# Patient Record
Sex: MALE | Race: WHITE | Employment: FULL TIME | URBAN - METROPOLITAN AREA
[De-identification: names, ages, dates, MRNs, and addresses within clinical notes are randomized per-mention and may not be internally consistent; named-entity substitution may affect disease eponyms.]

---

## 2023-03-08 SDOH — HEALTH STABILITY: PHYSICAL HEALTH: ON AVERAGE, HOW MANY DAYS PER WEEK DO YOU ENGAGE IN MODERATE TO STRENUOUS EXERCISE (LIKE A BRISK WALK)?: 2 DAYS

## 2023-03-08 SDOH — HEALTH STABILITY: PHYSICAL HEALTH: ON AVERAGE, HOW MANY MINUTES DO YOU ENGAGE IN EXERCISE AT THIS LEVEL?: 30 MIN

## 2023-03-09 ENCOUNTER — OFFICE VISIT (OUTPATIENT)
Dept: ORTHOPEDIC SURGERY | Age: 43
End: 2023-03-09

## 2023-03-09 DIAGNOSIS — S83.241A ACUTE MEDIAL MENISCUS TEAR OF RIGHT KNEE, INITIAL ENCOUNTER: Primary | ICD-10-CM

## 2023-03-09 DIAGNOSIS — M17.11 PRIMARY OSTEOARTHRITIS OF RIGHT KNEE: ICD-10-CM

## 2023-03-09 DIAGNOSIS — M17.12 PRIMARY OSTEOARTHRITIS OF LEFT KNEE: ICD-10-CM

## 2023-03-09 DIAGNOSIS — M25.561 RIGHT KNEE PAIN, UNSPECIFIED CHRONICITY: ICD-10-CM

## 2023-03-12 NOTE — PROGRESS NOTES
Date:  3/9/2023    Name:  Vickie Blankenship  Address:  [address] [facility] [address]    :  1980      Age:   43 y.o. Chief Complaint    Knee Pain (Right knee pain )      History of Present Illness:  Vickie Blankenship is a 43 y.o. male who presents for evaluation of his right knee pain. The patient has a past medical history of an ACL reconstruction on the left knee. Patient presents today due to a new injury he sustained over the summer while playing basketball. He was playing and felt a pop with some pain. His pain improved however he went on recent skiing trip and felt another pop approximately 1 week ago. He noticed increased pain in the medial joint line associated with knee swelling. He describes his pain as 4/10, achy and sharp with decreased range of motion. Since then with exercise he has noticed increased pain and associated crepitus and mechanical symptoms in the medial compartment of the right knee. Conservative treatment includes: Rest, ice, activity modification and over-the-counter pain medication. He has attempted a home exercise program. The patient denies any numbness, paresthesias, or weakness. Past Medical History:   Diagnosis Date    Seasonal allergies         Past Surgical History:   Procedure Laterality Date    CHOLECYSTECTOMY         No family history on file.     Social History     Socioeconomic History    Marital status:    Tobacco Use    Smoking status: Some Days     Types: Cigarettes   Substance and Sexual Activity    Alcohol use: Yes     Comment: Occasionally    Drug use: No     Social Determinants of Health     Physical Activity: Insufficiently Active    Days of Exercise per Week: 2 days    Minutes of Exercise per Session: 30 min   Intimate Partner Violence: Not At Risk    Fear of Current or Ex-Partner: No    Emotionally Abused: No    Physically Abused: No    Sexually Abused: No       Current Outpatient Medications   Medication Sig Dispense Refill    Fexofenadine HCl (ALLEGRA ALLERGY PO) Take  by mouth. omeprazole (PRILOSEC) 10 MG capsule Take 10 mg by mouth daily. lansoprazole (PREVACID) 30 MG capsule Take 1 capsule by mouth daily. 30 capsule 0     No current facility-administered medications for this visit. No Known Allergies      Review of Systems:  A 14 point review of systems was completed by the patient and is available in the media section of the scanned medical record and was reviewed. Vital Signs: There were no vitals taken for this visit. General Exam:    General: Callum Stern is a healthy and well appearing 43y.o. year old male who is sitting comfortably in our office in no acute distress. Neuro: Alert & Oriented x 3,  normal,  no focal deficits noted. Normal mood & affect. Eyes: Sclera clear  Ears: Normal external ear  Pulm: Respirations unlabored and regular   Skin: Warm, well perfused      Knee Examination:     Inspection:  No effusion, ecchymosis, discoloration, erythema, excessive warmth. no gross deformities, no signs of infection. Palpation: There is patellofemoral crepitus, there is medial joint line tenderness. No other osseous or soft tissue tenderness around the knee. Negative calf tenderness    Range of Motion:  0-125 degrees without pain or difficulty. Appropriate patellar mobility. Strength:  4-/5 quadriceps, 4-/5 hamstrings    Special Tests:  No ligament instability, valgus and varus stress test are stable at 0 and 30°. Lachman's exhibits a solid endpoint. Negative posterior drawer. Negative Homans sign. Positive flexion test.  Positive Andreas's. Gait: Mildly antalgic, without the use of assistive devices    Alignment: Neutral    Neuro: Sensation equal bilateral lower extremities    Vascular: 2+ posterior tibialis pulse      Contralateral Knee Comparison Examination: Left    Inspection:  No effusion, ecchymosis, discoloration, erythema, excessive warmth.  no gross deformities, no signs of infection. Palpation: There is patellofemoral crepitus, there is medial joint line tenderness. No other osseous or soft tissue tenderness around the knee. Negative calf tenderness    Range of Motion:  0-125 degrees without pain or difficulty. Appropriate patellar mobility. Strength:  4-/5 quadriceps, 4-/5 hamstrings    Special Tests:  No ligament instability, valgus and varus stress test are stable at 0 and 30°. Lachman's exhibits a solid endpoint. Negative posterior drawer. Negative Homans sign    Gait: Mildly antalgic, without the use of assistive devices    Alignment: Neutral    Neuro: Sensation equal bilateral lower extremities    Vascular: 2+ posterior tibialis pulse        Radiology:   Previously obtain imaging reviewed. X-rays obtained and reviewed in office: AP and merchant view of both knees and a lateral of the right. Impression: No fractures, dislocations, visible tumors, or signs of acute trauma. The medial and lateral femoral-tibial compartments exhibit less than 50% decreased joint space bilaterally. There's 50% decreased joint spacing in the patellofemoral joint of the right knee. Over 50% decreased femoral-tibial joint space decreased in the medial aspect of the left knee. KL grade 2. Patella is riding appropriately in the trochlear groove with no subluxation or tilt noted. Hesham-Yared ratio of approximately 1. No osteophytes or bone growths noted. No loose bodies or foreign bodies.         Office Procedures:  Orders Placed This Encounter   Procedures    XR KNEE RIGHT (3 VIEWS)     Standing Status:   Future     Number of Occurrences:   1     Standing Expiration Date:   9/9/2023    MRI KNEE RIGHT WO CONTRAST     Standing Status:   Future     Standing Expiration Date:   3/9/2024     Scheduling Instructions:      [address] [place] [address]            PHONE: [phone]      FAX: [phone]     Order Specific Question:   Reason for exam:     Answer:   R/O Medial meniscus tear     Order Specific Question:   What is the sedation requirement?     Answer:   None            Assessment: This patient is a 42 y.o. male presenting to the office for an evaluation of his acute on chronic right knee pain.  This patient has a working diagnosis of a medial meniscus tear in the right knee.    Encounter Diagnoses   Name Primary?    Acute medial meniscus tear of right knee, initial encounter Yes    Right knee pain, unspecified chronicity     Primary osteoarthritis of right knee     Primary osteoarthritis of left knee            Medical decision making:  Patient's workup and evaluation were reviewed with the patient in the office today.  Imaging was reviewed with the patient.  Given the patient's history and physical exam I believe with a reasonable degree of medical certainty that the patient has sustained a medial meniscus tear in the right knee.  Because of this I believe the patient has a medical necessity to obtain an MRI of the right knee.  He may be amendable to arthroscopy.  Risks and benefits of conservative treatment versus undergoing arthroscopy were reviewed.     All the patient's questions were answered while in the clinic.  The patient is understanding of all instructions and agrees with the plan.      Treatment Plan:    MRI of the right knee without contrast  Activity modification/Rest   Ice 20 minutes ever 1-2 hours PRN  Take medications as prescribed/instructed  Elevation   Lightweight exercise/low impact exercise  Appropriate diet/weight management      Follow up: After receiving MRI imaging and as needed      This patient exhibits moderate complexity for obtaining an independent history, reviewing past medical records, independent interpretation of diagnostic imaging, and further coordinating care.  The patient exhibits low risk given that the patient is being treated conservatively at  this time. Anusha Hartmann PA-C    Physician Assistant - Certified  [address] [place] [address]    03/12/23 8:28 PM        This dictation was performed with a verbal recognition program (DRAGON) and it was checked for errors. It is possible that there are still dictated errors within this office note. If so, please bring any errors to my attention for an addendum. All efforts were made to ensure that this office note is accurate.

## 2023-03-20 ENCOUNTER — TELEPHONE (OUTPATIENT)
Dept: ORTHOPEDIC SURGERY | Age: 43
End: 2023-03-20

## 2023-03-20 NOTE — TELEPHONE ENCOUNTER
Called and LVM that MRI had been authorized. All info was faxed to [facility] and he can schedule at his convenience. He will need a follow up with / Harley after scan.

## 2023-03-20 NOTE — TELEPHONE ENCOUNTER
Faxing Patient Information     Facility Name: [facility] Name: Ivatorin Alicea  Contact Number: [phone]  Facility Fax Number: [phone]

## 2023-04-04 ENCOUNTER — OFFICE VISIT (OUTPATIENT)
Dept: ORTHOPEDIC SURGERY | Age: 43
End: 2023-04-04

## 2023-04-04 VITALS — WEIGHT: 200 LBS | HEIGHT: 68 IN | BODY MASS INDEX: 30.31 KG/M2

## 2023-04-04 DIAGNOSIS — S83.241A ACUTE MEDIAL MENISCUS TEAR OF RIGHT KNEE, INITIAL ENCOUNTER: Primary | ICD-10-CM

## 2023-04-04 DIAGNOSIS — M25.561 RIGHT KNEE PAIN, UNSPECIFIED CHRONICITY: ICD-10-CM

## 2023-04-05 NOTE — PROGRESS NOTES
understanding of all instructions and agrees with the plan. Treatment Plan:    Weightbearing as tolerated  Medical optimization  Activity modification/Rest   Ice 20 minutes ever 1-2 hours PRN  Take medications as prescribed/instructed  Elevation   Lightweight exercise/low impact exercise  Appropriate diet/weight management      Follow up: one weeks and as needed      This patient exhibits moderate complexity for obtaining an independent history, reviewing past medical records, independent interpretation of diagnostic imaging, and further coordinating care. The patient exhibits low risk given that the patient is being treated conservatively at this time. Surgery is being considered as a treatment option. Candida Renteria PA-C    Physician Assistant - Certified  [address] [place] [address]    04/05/23 9:45 AM      This dictation was performed with a verbal recognition program [facility]) and it was checked for errors. It is possible that there are still dictated errors within this office note. If so, please bring any errors to my attention for an addendum. All efforts were made to ensure that this office note is accurate.

## 2023-04-13 ENCOUNTER — OFFICE VISIT (OUTPATIENT)
Dept: ORTHOPEDIC SURGERY | Age: 43
End: 2023-04-13

## 2023-04-13 VITALS — HEIGHT: 68 IN | BODY MASS INDEX: 30.31 KG/M2 | WEIGHT: 200 LBS

## 2023-04-13 DIAGNOSIS — M25.561 RIGHT KNEE PAIN, UNSPECIFIED CHRONICITY: ICD-10-CM

## 2023-04-13 DIAGNOSIS — S83.241A ACUTE MEDIAL MENISCUS TEAR OF RIGHT KNEE, INITIAL ENCOUNTER: Primary | ICD-10-CM

## 2023-04-13 DIAGNOSIS — M17.11 PRIMARY OSTEOARTHRITIS OF RIGHT KNEE: ICD-10-CM

## 2023-04-14 ENCOUNTER — TELEPHONE (OUTPATIENT)
Dept: ORTHOPEDIC SURGERY | Age: 43
End: 2023-04-14

## 2023-04-18 LAB
ALBUMIN SERPL-MCNC: 4.5 G/DL (ref 3.5–5.7)
ALP BLD-CCNC: 70 IU/L (ref 35–135)
ALT SERPL-CCNC: 24 IU/L (ref 10–60)
ANION GAP SERPL CALCULATED.3IONS-SCNC: 7 MMOL/L (ref 4–16)
AST SERPL-CCNC: 19 IU/L (ref 10–40)
BASOPHILS ABSOLUTE: 0 THOU/MCL (ref 0–0.2)
BASOPHILS ABSOLUTE: 1 %
BILIRUB SERPL-MCNC: 0.6 MG/DL (ref 0–1.2)
BUN BLDV-MCNC: 14 MG/DL (ref 8–26)
CALCIUM SERPL-MCNC: 10.1 MG/DL (ref 8.5–10.4)
CHLORIDE BLD-SCNC: 104 MEQ/L (ref 98–111)
CO2: 29 MMOL/L (ref 21–31)
CREAT SERPL-MCNC: 0.99 MG/DL (ref 0.7–1.3)
EGFR (CKD-EPI): 98 ML/MIN/1.73 M2
EKG ATRIAL RATE: 60 MS
EKG I-40 FRONT AXIS: 27 DEG
EKG I-40 HORIZONTAL AXIS: 79 DEG
EKG P DURATION: 105 MS
EKG P FRONT AXIS: 49 DEG
EKG P HORIZONTAL AXIS: 31 DEG
EKG P-R INTERVAL: 128 MS
EKG Q ONSET: 504 MS
EKG Q-T INTERVAL: 395 MS
EKG QRS AXIS: 63 DEG
EKG QRS HORIZONTAL AXIS: 11 DEG
EKG QRSD INTERVAL: 86 MS
EKG QTCB: 395 MS
EKG QTCF: 395 MS
EKG RR INTERVAL: 1000 MS
EKG S-T FRONT AXIS: 82 DEG
EKG S-T HORIZONTAL AXIS: 96 DEG
EKG T HORIZONTAL AXIS: 73 DEG
EKG T WAVE AXIS: 65 DEG
EKG T-40 FRONT AXIS: 83 DEG
EKG T-40 HORIZONTAL AXIS: -21 DEG
EOSINOPHILS ABSOLUTE: 0.1 THOU/MCL (ref 0.03–0.45)
EOSINOPHILS RELATIVE PERCENT: 3 %
ESTIMATED AVERAGE GLUCOSE: 105 MG/DL
GLUCOSE BLD-MCNC: 90 MG/DL (ref 70–99)
HBA1C MFR BLD: 5.3 % (ref 4.2–5.6)
HCT VFR BLD CALC: 46.2 % (ref 40–50)
HEART RATE: 60 BPM
HEIGHT, INCHES: NORMAL IN
HEMOGLOBIN: 15.7 G/DL (ref 13.5–16.5)
IDENTIFICATION: NORMAL
IDENTIFICATION: NORMAL
IMPRESSION: NORMAL
IMPRESSION: NORMAL
INR BLD: 1 (ref 0.8–1.2)
LYMPHOCYTES ABSOLUTE: 1.7 THOU/MCL (ref 1–4)
LYMPHOCYTES RELATIVE PERCENT: 31 %
MCH RBC QN AUTO: 30.1 PG (ref 27–33)
MCHC RBC AUTO-ENTMCNC: 33.9 G/DL (ref 32–36)
MCV RBC AUTO: 88.7 FL (ref 82–97)
MONOCYTES # BLD: 10 %
MONOCYTES ABSOLUTE: 0.5 THOU/MCL (ref 0.2–0.9)
NEUTROPHILS ABSOLUTE: 3.1 THOU/MCL (ref 1.8–7.7)
PDW BLD-RTO: 13.5 % (ref 12.3–17)
PLATELET # BLD: 271 THOU/MCL (ref 140–375)
PMV BLD AUTO: 8 FL (ref 7.4–11.5)
POTASSIUM SERPL-SCNC: 4.8 MEQ/L (ref 3.6–5.1)
PROTHROMBIN TIME: 13.3 SECONDS (ref 11.7–14.2)
RBC # BLD: 5.21 MIL/MCL (ref 4.4–5.8)
SEG NEUTROPHILS: 55 %
SODIUM BLD-SCNC: 140 MEQ/L (ref 135–145)
STAPH AUREUS CAPSULAR POLYSACCHARIDE ENZYME GENE: NEGATIVE
STAPH AUREUS.METHICILLIN RESISTANT DNA: NEGATIVE
TOTAL PROTEIN: 7.2 G/DL (ref 6–8)
VITAMIN D 25-HYDROXY: 28.5 NG/ML (ref 30–150)
WBC # BLD: 5.5 THOU/MCL (ref 3.6–10.5)

## 2023-04-18 NOTE — TELEPHONE ENCOUNTER
Auth: # [id number]    Date: 04/26/23 thru 07/25/23  Type of SX:  Outpatient  Location: [facility]  CPT: 38185, 27108   DX Code: R52.484E  SX area: Rt knee  Insurance: [facility]

## 2023-04-19 ENCOUNTER — TELEPHONE (OUTPATIENT)
Dept: ORTHOPEDIC SURGERY | Age: 43
End: 2023-04-19

## 2023-04-19 DIAGNOSIS — S83.241A ACUTE MEDIAL MENISCUS TEAR OF RIGHT KNEE, INITIAL ENCOUNTER: ICD-10-CM

## 2023-04-19 DIAGNOSIS — M25.561 RIGHT KNEE PAIN, UNSPECIFIED CHRONICITY: ICD-10-CM

## 2023-04-19 DIAGNOSIS — M17.11 PRIMARY OSTEOARTHRITIS OF RIGHT KNEE: Primary | ICD-10-CM

## 2023-04-24 NOTE — PROGRESS NOTES
Date:  2023    Name:  Sandhya Barrios  Address:  [address] [facility] [address]    :  1980      Age:   43 y.o. Chief Complaint    Knee Pain (Left knee, discuss sx)      History of Present Illness:  Sandhya Barrios is a 43 y.o. male who presents for a postop check and surgical discussion. To recap, the patient has a past medical history of an ACL reconstruction on the left knee. Patient presented to our office approximately 5 weeks ago to evaluate a new injury he sustained over the summer while playing basketball. He was playing and felt a pop with some pain. His pain improved however he went on recent skiing trip and felt another pop approximately 4-5 weeks ago and has been experiencing increased medial joint line discomfort ever since. An MRI was obtained to evaluate for a new medial meniscus tear. He continues to exhibit tenderness in the medial joint line with lateral movements. Conservative treatment has been utilized including: rest, ice, elevation, home exercises, activity modification, and NSAIDs as needed. The patient denies any new injury. The patient denies any numbness, paresthesias, or weakness. Past Medical History:   Diagnosis Date    Medial meniscus tear     RIGHT    Seasonal allergies     Wears contact lenses     Wears glasses         Past Surgical History:   Procedure Laterality Date    CHOLECYSTECTOMY         No family history on file.     Social History     Socioeconomic History    Marital status:      Spouse name: None    Number of children: None    Years of education: None    Highest education level: None   Tobacco Use    Smoking status: Some Days     Types: Cigarettes   Vaping Use    Vaping Use: Never used   Substance and Sexual Activity    Alcohol use: Yes     Comment: Occasionally    Drug use: No     Social Determinants of Health     Physical Activity: Insufficiently Active    Days of Exercise per Week: 2 days    Minutes of

## 2023-04-25 ENCOUNTER — HOSPITAL ENCOUNTER (OUTPATIENT)
Dept: PHYSICAL THERAPY | Age: 43
Setting detail: THERAPIES SERIES
Discharge: HOME | End: 2023-04-25
Payer: COMMERCIAL

## 2023-04-25 ENCOUNTER — ANESTHESIA EVENT (OUTPATIENT)
Dept: OPERATING ROOM | Age: 43
End: 2023-04-25
Payer: COMMERCIAL

## 2023-04-25 ENCOUNTER — OFFICE VISIT (OUTPATIENT)
Dept: ORTHOPEDIC SURGERY | Age: 43
End: 2023-04-25

## 2023-04-25 VITALS — HEIGHT: 68 IN | WEIGHT: 216 LBS | BODY MASS INDEX: 32.74 KG/M2

## 2023-04-25 DIAGNOSIS — Z01.818 PRE-OP EXAMINATION: Primary | ICD-10-CM

## 2023-04-25 DIAGNOSIS — S83.241A ACUTE MEDIAL MENISCUS TEAR OF RIGHT KNEE, INITIAL ENCOUNTER: ICD-10-CM

## 2023-04-25 DIAGNOSIS — M17.11 PRIMARY OSTEOARTHRITIS OF RIGHT KNEE: ICD-10-CM

## 2023-04-25 PROCEDURE — MISC250 COMPRESSION STOCKING: Performed by: PHYSICIAN ASSISTANT

## 2023-04-25 PROCEDURE — 97161 PT EVAL LOW COMPLEX 20 MIN: CPT | Performed by: PHYSICAL THERAPIST

## 2023-04-25 PROCEDURE — PREOPEXAM PRE-OP EXAM: Performed by: PHYSICIAN ASSISTANT

## 2023-04-25 NOTE — PLAN OF CARE
restriction or pain. (patient specific functional goal)    [] Progressing: [] Met: [] Not Met: [] Adjusted       Electronically signed by:  Rosalinda Alexis, PT, DPT    Note: If patient does not return for scheduled/ recommended follow up visits, this note will serve as a discharge from care along with most recent update on progress.

## 2023-04-25 NOTE — FLOWSHEET NOTE
[facility] and [address],  Sports Performance and Rehabilitation, [facility] [address]  [address] [facility]   [place], [place]  Phone: [phone]  Fax: [phone]      Physical Therapy Daily Treatment Note  Date:  2023    Patient Name:  Mesha Stanton    :  1980  MRN: [id number]  Restrictions/Precautions:    Medical/Treatment Diagnosis Information:  Primary osteoarthritis of right knee [M17.11]  Acute medial meniscus tear of right knee, initial encounter Patricia Strickland  Right knee pain, unspecified chronicity [M25.561]  Treatment Diagnosis: M25.561 R Knee Pain, R53.1 Weakness  Pre-op for R Knee Scope 89  Insurance/Certification information:  PT Insurance Information: [facility], no auth  Physician Information:  Soco Flaherty MD    Has the plan of care been signed (Y/N):        []  Yes  [x]  No     Date of Patient follow up with Physician: 23      Is this a Progress Report:     []  Yes  [x]  No        If Yes:  Date Range for reporting period:  Beginnin23  Ending:     Progress report will be due (10 Rx or 30 days whichever is less):        Recertification will be due (POC Duration  / 90 days whichever is less): 23         Visit # Insurance Allowable Auth Required   1 60 []  Yes []  No          OUTCOME MEASURE DATE SCORE   LEFS 23 20%            Latex Allergy:  [x]NO      []YES  Preferred Language for Healthcare:   [x]English       []other:    Pain level:  2-8/10     SUBJECTIVE:  See eval    OBJECTIVE:               ROM PROM AROM Overpressure Comment     L R L R L R     Flexion 140 138             Extension 0 0                                                       Strength L R Comment   Quad 5 5     Hamstring 5 4     Gastroc NT NT     Hip  flexion 5 4+     Hip abd 4+ 4                               Special Test Results/Comment   Homans NV   Temp NV         Girth L R Post-Vaso   Mid Patella 40.8 42.0     Suprapatellar

## 2023-04-26 ENCOUNTER — HOSPITAL ENCOUNTER (OUTPATIENT)
Age: 43
Setting detail: OUTPATIENT SURGERY
Discharge: HOME | End: 2023-04-26
Attending: ORTHOPAEDIC SURGERY | Admitting: ORTHOPAEDIC SURGERY
Payer: COMMERCIAL

## 2023-04-26 ENCOUNTER — ANESTHESIA (OUTPATIENT)
Dept: OPERATING ROOM | Age: 43
End: 2023-04-26
Payer: COMMERCIAL

## 2023-04-26 VITALS
BODY MASS INDEX: 33.68 KG/M2 | OXYGEN SATURATION: 97 % | SYSTOLIC BLOOD PRESSURE: 111 MMHG | WEIGHT: 214.6 LBS | DIASTOLIC BLOOD PRESSURE: 77 MMHG | HEIGHT: 67 IN | TEMPERATURE: 97 F | HEART RATE: 85 BPM | RESPIRATION RATE: 16 BRPM

## 2023-04-26 DIAGNOSIS — Z98.890 S/P ARTHROSCOPIC SURGERY OF RIGHT KNEE: Primary | ICD-10-CM

## 2023-04-26 PROCEDURE — 3600000004 HC SURGERY LEVEL 4 BASE: Performed by: ORTHOPAEDIC SURGERY

## 2023-04-26 PROCEDURE — 7100000011 HC PHASE II RECOVERY - ADDTL 15 MIN: Performed by: ORTHOPAEDIC SURGERY

## 2023-04-26 PROCEDURE — 2580000003 HC RX 258: Performed by: ORTHOPAEDIC SURGERY

## 2023-04-26 PROCEDURE — 3600000014 HC SURGERY LEVEL 4 ADDTL 15MIN: Performed by: ORTHOPAEDIC SURGERY

## 2023-04-26 PROCEDURE — 6360000002 HC RX W HCPCS: Performed by: NURSE ANESTHETIST, CERTIFIED REGISTERED

## 2023-04-26 PROCEDURE — 2580000003 HC RX 258: Performed by: ANESTHESIOLOGY

## 2023-04-26 PROCEDURE — 2709999900 HC NON-CHARGEABLE SUPPLY: Performed by: ORTHOPAEDIC SURGERY

## 2023-04-26 PROCEDURE — 7100000010 HC PHASE II RECOVERY - FIRST 15 MIN: Performed by: ORTHOPAEDIC SURGERY

## 2023-04-26 PROCEDURE — 7100000001 HC PACU RECOVERY - ADDTL 15 MIN: Performed by: ORTHOPAEDIC SURGERY

## 2023-04-26 PROCEDURE — 2720000010 HC SURG SUPPLY STERILE: Performed by: ORTHOPAEDIC SURGERY

## 2023-04-26 PROCEDURE — 6360000002 HC RX W HCPCS: Performed by: ANESTHESIOLOGY

## 2023-04-26 PROCEDURE — 6360000002 HC RX W HCPCS: Performed by: ORTHOPAEDIC SURGERY

## 2023-04-26 PROCEDURE — 3700000001 HC ADD 15 MINUTES (ANESTHESIA): Performed by: ORTHOPAEDIC SURGERY

## 2023-04-26 PROCEDURE — 7100000000 HC PACU RECOVERY - FIRST 15 MIN: Performed by: ORTHOPAEDIC SURGERY

## 2023-04-26 PROCEDURE — 2580000003 HC RX 258: Performed by: NURSE ANESTHETIST, CERTIFIED REGISTERED

## 2023-04-26 PROCEDURE — 3700000000 HC ANESTHESIA ATTENDED CARE: Performed by: ORTHOPAEDIC SURGERY

## 2023-04-26 PROCEDURE — 2500000003 HC RX 250 WO HCPCS: Performed by: ORTHOPAEDIC SURGERY

## 2023-04-26 RX ORDER — SODIUM CHLORIDE 0.9 % (FLUSH) 0.9 %
5-40 SYRINGE (ML) INJECTION EVERY 12 HOURS SCHEDULED
Status: DISCONTINUED | OUTPATIENT
Start: 2023-04-26 | End: 2023-04-26 | Stop reason: HOSPADM

## 2023-04-26 RX ORDER — LIDOCAINE HYDROCHLORIDE 20 MG/ML
INJECTION, SOLUTION INTRAVENOUS PRN
Status: DISCONTINUED | OUTPATIENT
Start: 2023-04-26 | End: 2023-04-26 | Stop reason: SDUPTHER

## 2023-04-26 RX ORDER — KETOROLAC TROMETHAMINE 30 MG/ML
INJECTION, SOLUTION INTRAMUSCULAR; INTRAVENOUS PRN
Status: DISCONTINUED | OUTPATIENT
Start: 2023-04-26 | End: 2023-04-26 | Stop reason: SDUPTHER

## 2023-04-26 RX ORDER — LABETALOL HYDROCHLORIDE 5 MG/ML
10 INJECTION, SOLUTION INTRAVENOUS
Status: DISCONTINUED | OUTPATIENT
Start: 2023-04-26 | End: 2023-04-26 | Stop reason: HOSPADM

## 2023-04-26 RX ORDER — DEXAMETHASONE SODIUM PHOSPHATE 4 MG/ML
INJECTION, SOLUTION INTRA-ARTICULAR; INTRALESIONAL; INTRAMUSCULAR; INTRAVENOUS; SOFT TISSUE PRN
Status: DISCONTINUED | OUTPATIENT
Start: 2023-04-26 | End: 2023-04-26 | Stop reason: SDUPTHER

## 2023-04-26 RX ORDER — PROPOFOL 10 MG/ML
INJECTION, EMULSION INTRAVENOUS PRN
Status: DISCONTINUED | OUTPATIENT
Start: 2023-04-26 | End: 2023-04-26 | Stop reason: SDUPTHER

## 2023-04-26 RX ORDER — ONDANSETRON 2 MG/ML
INJECTION INTRAMUSCULAR; INTRAVENOUS PRN
Status: DISCONTINUED | OUTPATIENT
Start: 2023-04-26 | End: 2023-04-26 | Stop reason: SDUPTHER

## 2023-04-26 RX ORDER — SODIUM CHLORIDE, SODIUM LACTATE, POTASSIUM CHLORIDE, CALCIUM CHLORIDE 600; 310; 30; 20 MG/100ML; MG/100ML; MG/100ML; MG/100ML
INJECTION, SOLUTION INTRAVENOUS CONTINUOUS
Status: DISCONTINUED | OUTPATIENT
Start: 2023-04-26 | End: 2023-04-26 | Stop reason: HOSPADM

## 2023-04-26 RX ORDER — SODIUM CHLORIDE, SODIUM LACTATE, POTASSIUM CHLORIDE, CALCIUM CHLORIDE 600; 310; 30; 20 MG/100ML; MG/100ML; MG/100ML; MG/100ML
INJECTION, SOLUTION INTRAVENOUS CONTINUOUS PRN
Status: DISCONTINUED | OUTPATIENT
Start: 2023-04-26 | End: 2023-04-26 | Stop reason: SDUPTHER

## 2023-04-26 RX ORDER — ROPIVACAINE HYDROCHLORIDE 5 MG/ML
INJECTION, SOLUTION EPIDURAL; INFILTRATION; PERINEURAL PRN
Status: DISCONTINUED | OUTPATIENT
Start: 2023-04-26 | End: 2023-04-26 | Stop reason: HOSPADM

## 2023-04-26 RX ORDER — MEPERIDINE HYDROCHLORIDE 25 MG/ML
12.5 INJECTION INTRAMUSCULAR; INTRAVENOUS; SUBCUTANEOUS EVERY 5 MIN PRN
Status: DISCONTINUED | OUTPATIENT
Start: 2023-04-26 | End: 2023-04-26 | Stop reason: HOSPADM

## 2023-04-26 RX ORDER — HYDRALAZINE HYDROCHLORIDE 20 MG/ML
10 INJECTION INTRAMUSCULAR; INTRAVENOUS
Status: DISCONTINUED | OUTPATIENT
Start: 2023-04-26 | End: 2023-04-26 | Stop reason: HOSPADM

## 2023-04-26 RX ORDER — MIDAZOLAM HYDROCHLORIDE 1 MG/ML
INJECTION INTRAMUSCULAR; INTRAVENOUS PRN
Status: DISCONTINUED | OUTPATIENT
Start: 2023-04-26 | End: 2023-04-26 | Stop reason: SDUPTHER

## 2023-04-26 RX ORDER — SODIUM CHLORIDE, SODIUM LACTATE, POTASSIUM CHLORIDE, AND CALCIUM CHLORIDE .6; .31; .03; .02 G/100ML; G/100ML; G/100ML; G/100ML
IRRIGANT IRRIGATION PRN
Status: DISCONTINUED | OUTPATIENT
Start: 2023-04-26 | End: 2023-04-26 | Stop reason: HOSPADM

## 2023-04-26 RX ORDER — FENTANYL CITRATE 50 UG/ML
INJECTION, SOLUTION INTRAMUSCULAR; INTRAVENOUS PRN
Status: DISCONTINUED | OUTPATIENT
Start: 2023-04-26 | End: 2023-04-26 | Stop reason: SDUPTHER

## 2023-04-26 RX ORDER — SODIUM CHLORIDE 0.9 % (FLUSH) 0.9 %
5-40 SYRINGE (ML) INJECTION PRN
Status: DISCONTINUED | OUTPATIENT
Start: 2023-04-26 | End: 2023-04-26 | Stop reason: HOSPADM

## 2023-04-26 RX ORDER — DIPHENHYDRAMINE HYDROCHLORIDE 50 MG/ML
12.5 INJECTION INTRAMUSCULAR; INTRAVENOUS
Status: DISCONTINUED | OUTPATIENT
Start: 2023-04-26 | End: 2023-04-26 | Stop reason: HOSPADM

## 2023-04-26 RX ORDER — METOCLOPRAMIDE HYDROCHLORIDE 5 MG/ML
10 INJECTION INTRAMUSCULAR; INTRAVENOUS
Status: DISCONTINUED | OUTPATIENT
Start: 2023-04-26 | End: 2023-04-26 | Stop reason: HOSPADM

## 2023-04-26 RX ORDER — SODIUM CHLORIDE 9 MG/ML
25 INJECTION, SOLUTION INTRAVENOUS PRN
Status: DISCONTINUED | OUTPATIENT
Start: 2023-04-26 | End: 2023-04-26 | Stop reason: HOSPADM

## 2023-04-26 RX ORDER — HYDROCODONE BITARTRATE AND ACETAMINOPHEN 5; 325 MG/1; MG/1
1 TABLET ORAL EVERY 4 HOURS PRN
Qty: 18 TABLET | Refills: 0 | Status: SHIPPED | OUTPATIENT
Start: 2023-04-26 | End: 2023-04-29

## 2023-04-26 RX ADMIN — CEFAZOLIN 2000 MG: 2 INJECTION, POWDER, FOR SOLUTION INTRAMUSCULAR; INTRAVENOUS at 10:04

## 2023-04-26 RX ADMIN — MIDAZOLAM HYDROCHLORIDE 2 MG: 2 INJECTION, SOLUTION INTRAMUSCULAR; INTRAVENOUS at 10:03

## 2023-04-26 RX ADMIN — HYDROMORPHONE HYDROCHLORIDE 0.5 MG: 1 INJECTION, SOLUTION INTRAMUSCULAR; INTRAVENOUS; SUBCUTANEOUS at 11:38

## 2023-04-26 RX ADMIN — FENTANYL CITRATE 100 MCG: 50 INJECTION, SOLUTION INTRAMUSCULAR; INTRAVENOUS at 10:07

## 2023-04-26 RX ADMIN — ONDANSETRON 4 MG: 2 INJECTION INTRAMUSCULAR; INTRAVENOUS at 10:13

## 2023-04-26 RX ADMIN — FENTANYL CITRATE 50 MCG: 50 INJECTION, SOLUTION INTRAMUSCULAR; INTRAVENOUS at 11:05

## 2023-04-26 RX ADMIN — KETOROLAC TROMETHAMINE 30 MG: 30 INJECTION, SOLUTION INTRAMUSCULAR at 10:58

## 2023-04-26 RX ADMIN — SODIUM CHLORIDE, POTASSIUM CHLORIDE, SODIUM LACTATE AND CALCIUM CHLORIDE: 600; 310; 30; 20 INJECTION, SOLUTION INTRAVENOUS at 09:54

## 2023-04-26 RX ADMIN — DEXAMETHASONE SODIUM PHOSPHATE 4 MG: 4 INJECTION, SOLUTION INTRAMUSCULAR; INTRAVENOUS at 10:13

## 2023-04-26 RX ADMIN — SODIUM CHLORIDE, SODIUM LACTATE, POTASSIUM CHLORIDE, AND CALCIUM CHLORIDE: .6; .31; .03; .02 INJECTION, SOLUTION INTRAVENOUS at 10:04

## 2023-04-26 RX ADMIN — PROPOFOL 300 MG: 10 INJECTION, EMULSION INTRAVENOUS at 10:07

## 2023-04-26 RX ADMIN — SODIUM CHLORIDE, SODIUM LACTATE, POTASSIUM CHLORIDE, AND CALCIUM CHLORIDE: .6; .31; .03; .02 INJECTION, SOLUTION INTRAVENOUS at 10:59

## 2023-04-26 RX ADMIN — LIDOCAINE HYDROCHLORIDE 100 MG: 20 INJECTION, SOLUTION INTRAVENOUS at 10:07

## 2023-04-26 ASSESSMENT — PAIN DESCRIPTION - PAIN TYPE: TYPE: SURGICAL PAIN

## 2023-04-26 ASSESSMENT — PAIN SCALES - GENERAL
PAINLEVEL_OUTOF10: 0
PAINLEVEL_OUTOF10: 6
PAINLEVEL_OUTOF10: 4
PAINLEVEL_OUTOF10: 4
PAINLEVEL_OUTOF10: 0
PAINLEVEL_OUTOF10: 6

## 2023-04-26 ASSESSMENT — PAIN DESCRIPTION - FREQUENCY: FREQUENCY: CONTINUOUS

## 2023-04-26 ASSESSMENT — PAIN DESCRIPTION - ORIENTATION
ORIENTATION: RIGHT
ORIENTATION: RIGHT

## 2023-04-26 ASSESSMENT — PAIN - FUNCTIONAL ASSESSMENT
PAIN_FUNCTIONAL_ASSESSMENT: ACTIVITIES ARE NOT PREVENTED
PAIN_FUNCTIONAL_ASSESSMENT: 0-10

## 2023-04-26 ASSESSMENT — PAIN DESCRIPTION - DESCRIPTORS
DESCRIPTORS: SORE;THROBBING
DESCRIPTORS: SORE;THROBBING

## 2023-04-26 ASSESSMENT — PAIN DESCRIPTION - LOCATION
LOCATION: KNEE
LOCATION: KNEE

## 2023-04-26 NOTE — PROGRESS NOTES
PACU Transfer to [facility]    Procedure(s):  RIGHT KNEE PARTIAL MEDIAL MENISCECTOMY, ARTHROSCOPIC    Pt's Current Allergies: Patient has no known allergies. Pt meets criteria to transfer to next phase of care per Arnold Odonnell and CHASIDY standards    No results for input(s): POCGLU in the last 72 hours. Vitals:    04/26/23 1315   BP: (!) 129/92   Pulse: 82   Resp: 18   Temp: 97.3 °F (36.3 °C)   SpO2: 95%      BP within 20% of pt's admitting BP as per Perez Score      Intake/Output Summary (Last 24 hours) at 4/26/2023 1338  Last data filed at 4/26/2023 1059  Gross per 24 hour   Intake 1000 ml   Output --   Net 1000 ml       Pain assessment:  receiving treatment  Pain Level: 4    Patient was assessed for unknown alterations to skin integrity. There were not unknown alterations observed. Patient transferred to care of Calin Alicea RN.    Family updated and directed to Calin Alicea    4/26/2023 1:38 PM

## 2023-04-26 NOTE — PROGRESS NOTES
Ambulatory Surgery/Procedure Discharge Note    Vitals:    04/26/23 1349   BP: 111/77   Pulse: 85   Resp: 16   Temp:    SpO2: 97%       In: 1000 [I.V.:1000]  Out: -  Pt declined drink or snack; voided X 1 in toilet    Restroom use offered before discharge. Yes    Pain assessment:  none  Pain Level: 0    Pt arrived to [facility] from PACU s/p right knee surgery. Pt alert; speech clear; breathing easily on RA; denies any pain; dressing of Ace wrap from mid thigh through foot, and same is clean, dry and intact. Two ice packs surrounding right knee and leg elevated. Foot and toes warm and pink. Pt declined offer of drink or snack. Discharge instructions discussed with pt and pt's father at bedside, and both verbalized understanding. Father has in his possession filled RX for Norco.  IV removed, and dressing applied. Refilled ice packs for home. Patient discharged to home/self care.  Patient discharged via wheel chair by PCA to waiting family/S.O.       4/26/2023 2:33 PM

## 2023-04-26 NOTE — DISCHARGE INSTRUCTIONS
PATIENT INSTRUCTIONS FOLLOWING OUTPATIENT   ARTHROSCOPIC KNEE SURGERY    1. Elevate the operated area above heart level and apply ice bag 20 minutes of every hour to operative extremity. 2. Dressing will be removed in the clinic / PT. DO NOT remove sutures, staples or Steri-strips. 3. Use crutches as needed. 4. Toe touch weight bearing with crutches for assistance to operative extremity. 5. Ankle pumps frequently  6. Follow up with previously scheduled appointment or call the office for an appointment if you do not already have one.  7. Call your doctor if:   Your incision becomes red, swollen or develops drainage  If the wound becomes increasingly painful uncontrolled with the pain medications. If you develop fever greater than 101 degrees after the first 48 hours. 8. Please call with questions/concerns. Board Certified Orthopaedic Surgeon  President and [address] and [address] [place] [address],[address]  One of the top twenty knee centers in [facility] Stu Summers MD    POSTOPERATIVE INSTRUCTIONS  DIET  ? Begin with clear liquids and light foods (Jello, soup, etc)  ? Progress to your normal diet if you are not nauseated  ? Protein shakes every day (high protein low carbohydrate) 4 weeks  ? Multi-vitamins 4 weeks    WOUND CARE  ? Maintain your operative dressing, loosen bandage if swelling of the foot and ankle occurs  ? It is normal for the knee to bleed and swell following surgery - if blood soaks onto the ACE bandage, do not become alarmed - reinforce with additional dressing  ? Remove surgical dressing on the third post-operative day. If minimal drainage is present, apply band-aids over incision and change daily - you may then shave as long as the wounds remain sealed with the band-aid  ?  To avoid infection, keep surgical incisions clean and dry - you may shower by placing a large garbage bag over your knee starting the day after

## 2023-04-26 NOTE — PROGRESS NOTES
1115 Admitted to PACU from [address]. Connected to monitor. Report at bedside. No sign of pain or nausea. 1120 Oral airway removed.  O2 decreased to 2L

## 2023-04-26 NOTE — H&P
Department of Orthopedic Surgery  History and Physical        CHIEF COMPLAINT:  Right knee pain    History Obtained From:  patient    HISTORY OF PRESENT ILLNESS:      The patient is a 43 y.o. male  who presents with right knee pain. He was found to have a medial meniscus tear that has been symptomatic daily. After a thorough evaluation by Dr. Abimael Peguero and a discussion of all the treatment options the patient has elected to undergo a right knee arthroscopy. His PCP clearance and  labs have been reviewed. He attests to no change in his medical history since his visit with his PCP. Past Medical History:        Diagnosis Date    Medial meniscus tear     RIGHT    Seasonal allergies     Wears contact lenses     Wears glasses      Past Surgical History:        Procedure Laterality Date    APPENDECTOMY      CHOLECYSTECTOMY      KNEE SURGERY           Medications Prior to Admission:   Prior to Admission medications    Medication Sig Start Date End Date Taking? Authorizing Provider   Multiple Vitamin (MULTIVITAMIN ADULT PO) Take 1 tablet by mouth daily multivitamin (MULTIVITAMIN) tablet    Historical Provider, MD   Fexofenadine HCl (ALLEGRA ALLERGY PO) Take  by mouth. Historical Provider, MD   omeprazole (PRILOSEC) 10 MG capsule Take 1 capsule by mouth daily    Historical Provider, MD       Allergies:  Patient has no known allergies. Social History:    Tobacco:  reports that he has been smoking cigarettes. He does not have any smokeless tobacco history on file. Alcohol:  reports current alcohol use. Illicit Drug: No  Family History:   History reviewed. No pertinent family history.   REVIEW OF SYSTEMS:  CONSTITUTIONAL:  negative  MUSCULOSKELETAL:  positive for  pain    PHYSICAL EXAM:  Admission weight: 216 lb (98 kg)  5' 7\" (170.2 cm)  VITALS:  BP (!) 132/97   Pulse 69   Temp 98.4 °F (36.9 °C) (Temporal)   Resp 16   Ht 5' 7\" (1.702 m)   Wt 214 lb 9.6 oz (97.3 kg)   SpO2 99%   BMI 33.61 kg/m²

## 2023-04-26 NOTE — PROGRESS NOTES
04/26/23 0944   Encounter Summary   Encounter Overview/Reason  Initial Encounter   Service Provided For: Patient   Referral/Consult From: [address] Family members   Last Encounter  04/26/23  (ro)   Complexity of Encounter Moderate   Begin Time 0930   End Time  0935   Total Time Calculated 5 min   Encounter    Type Pre-Op   Assessment/Intervention/Outcome   Assessment Calm; Hopeful   Intervention Active listening;Explored/Affirmed feelings, thoughts, concerns;Nurtured Hope   Outcome Comfort;Engaged in conversation; Refused/Declined     No particular needs at this time.     Staff Mikey Navarro MA, [facility]

## 2023-04-26 NOTE — ANESTHESIA PRE PROCEDURE
Department of Anesthesiology  Preprocedure Note       Name:  Carito Castelan   Age:  43 y.o.  :  1980                                          MRN:  [id number]         Date:  2023      Surgeon: Carlos Mc):  Ct Rolon MD    Procedure: Procedure(s):  RIGHT KNEE PARTIAL MEDIAL MENISCECTOMY, ARTHROSCOPIC    Medications prior to admission:   Prior to Admission medications    Medication Sig Start Date End Date Taking? Authorizing Provider   Multiple Vitamin (MULTIVITAMIN ADULT PO) Take 1 tablet by mouth daily multivitamin (MULTIVITAMIN) tablet    Historical Provider, MD   Fexofenadine HCl (ALLEGRA ALLERGY PO) Take  by mouth. Historical Provider, MD   omeprazole (PRILOSEC) 10 MG capsule Take 1 capsule by mouth daily    Historical Provider, MD       Current medications:    Current Facility-Administered Medications   Medication Dose Route Frequency Provider Last Rate Last Admin    ceFAZolin (ANCEF) 2,000 mg in sodium chloride 0.9 % 50 mL IVPB (mini-bag)  2,000 mg IntraVENous Once Ct Rolon MD        lactated ringers IV soln infusion   IntraVENous Continuous Armando Crowell MD           Allergies:  No Known Allergies    Problem List:  There is no problem list on file for this patient.       Past Medical History:        Diagnosis Date    Medial meniscus tear     RIGHT    Seasonal allergies     Wears contact lenses     Wears glasses        Past Surgical History:        Procedure Laterality Date    CHOLECYSTECTOMY         Social History:    Social History     Tobacco Use    Smoking status: Some Days     Types: Cigarettes    Smokeless tobacco: Not on file   Substance Use Topics    Alcohol use: Yes     Comment: Occasionally                                Ready to quit: Not Answered  Counseling given: Not Answered      Vital Signs (Current):   Vitals:    23 1249 23 0903   BP:  (!) 132/97   Pulse:  69   Resp:  16   Temp:  98.4 °F (36.9 °C)   TempSrc:  Temporal   SpO2:  99%   Weight: 216

## 2023-04-26 NOTE — ANESTHESIA POSTPROCEDURE EVALUATION
Department of Anesthesiology  Postprocedure Note    Patient: Luis Alonzo  MRN: [id number]  YOB: 1980  Date of evaluation: 4/26/2023      Procedure Summary     Date: 04/26/23 Room / Location: [address]    Anesthesia Start: 1004 Anesthesia Stop: 1115    Procedure: RIGHT KNEE PARTIAL MEDIAL MENISCECTOMY, ARTHROSCOPIC (Right) Diagnosis:       Acute medial meniscus tear of right knee, initial encounter      (Acute medial meniscus tear of right knee, initial encounter [F27.702D])    Surgeons: Leia Celeste MD Responsible Provider: Carolina Valerio MD    Anesthesia Type: general ASA Status: 2          Anesthesia Type: No value filed.     Perez Phase I: Perez Score: 4    Perez Phase II:        Anesthesia Post Evaluation    Patient location during evaluation: PACU  Patient participation: complete - patient participated  Level of consciousness: awake and alert  Pain score: 0  Airway patency: patent  Nausea & Vomiting: no nausea and no vomiting  Complications: no  Cardiovascular status: hemodynamically stable  Respiratory status: acceptable  Hydration status: euvolemic

## 2023-04-26 NOTE — PROGRESS NOTES
Called wife and updated. Patient with periods of respiratory rate dipping to 5. Encouraged patient to take deep breath, o2 weaned to 1 liter. Continue to monitor.

## 2023-04-27 ENCOUNTER — HOSPITAL ENCOUNTER (OUTPATIENT)
Dept: PHYSICAL THERAPY | Age: 43
Discharge: HOME | End: 2023-04-27
Payer: COMMERCIAL

## 2023-04-27 PROCEDURE — 97110 THERAPEUTIC EXERCISES: CPT | Performed by: PHYSICAL THERAPIST

## 2023-04-27 NOTE — FLOWSHEET NOTE
The [facility] and [address],  Sports Performance and Rehabilitation, [facility] [address]  793 [facility],5Th Floor   [place]  Phone: [phone]  Fax: [phone]      Physical Therapy Daily Treatment Note  Date:  2023    Patient Name:  Deborah dAams    :  1980  MRN: [id number]  Restrictions/Precautions:    Medical/Treatment Diagnosis Information:  Primary osteoarthritis of right knee [M17.11]  Acute medial meniscus tear of right knee, initial encounter [S83.241A]  Right knee pain, unspecified chronicity [M25.561]  Treatment Diagnosis: M25.561 R Knee Pain, R53.1 Weakness  S/P R Knee Scope - PMME   Insurance/Certification information:  PT Insurance Information: [facility] - 61 VPCY, no auth  Physician Information:  Iliana Montague MD    Has the plan of care been signed (Y/N):        [x]  Yes  []  No     Date of Patient follow up with Physician: 3 6 and 12  Patient seen in consultation with Dr. Nely Hutton who established the initial/subsequent treatment protocol. 23: surgery went well, should feel better, big flap of meniscus      Is this a Progress Report:     []  Yes  [x]  No        If Yes:  Date Range for reporting period:  Beginnin23  Ending:     Progress report will be due (10 Rx or 30 days whichever is less):        Recertification will be due (POC Duration  / 90 days whichever is less): 23         Visit # Insurance Allowable Auth Required   2 60 []  Yes []  No          OUTCOME MEASURE DATE SCORE   LEFS 23 20% deficit   LEFS 23 post-op 99% deficit       Latex Allergy:  [x]NO      []YES  Preferred Language for Healthcare:   [x]English       []other:    Pain level:  2-8/10     SUBJECTIVE: Pt doing okay 1-day PO. Has not needed pain meds thus far. Keeping activity limited and using B/L crutches. No concerns.      OBJECTIVE:     23          ROM PROM AROM Overpressure Comment     L R L R L R     Flexion 140 90

## 2023-04-27 NOTE — OP NOTE
Operative Note      Patient: Carito Castelan  YOB: 1980  MRN: [id number]     DATE OF PROCEDURE:  04/26/2023     PREOPERATIVE DIAGNOSIS:  Degenerative medial meniscus tear, right knee. POSTOPERATIVE DIAGNOSIS:  Degenerative medial meniscus tear, right knee. OPERATIONS PERFORMED:  Diagnostic arthroscopy, right knee, with partial  medial meniscectomy, complex flap tears, medial meniscus, right knee. Surgeon(s):  Ct Rolon MD    Assistant:   Surgical Assistant: Luis Phelps  Physician Assistant: Marshal Goyal       ANESTHESIA:  General.     OPERATIVE INDICATIONS:  This patient had advancing mechanical symptoms  and locking affecting all activities of daily living. He understood the  associated risks, benefits, and consented to the operative procedure. FINDINGS:  Complex medial meniscus tear of the right knee was found with  two flap tears, mechanically catching in the joint. The procedure went  well resecting the flap tears and leaving the remaining 40% of the  medial meniscus. There was 20 mm articular cartilage damage on the  weightbearing surface of the medial femoral condyle. The patient was  advised he does have some underlying arthritis and may have symptoms  related to this after the procedure. There were no complications. A  physician's assistant was present and required throughout the procedure. ESTIMATED BLOOD LOSS:  10 mL. OPERATIVE PROCEDURE:  This patient was placed in supine position upon  the operating room table and after satisfactory level of general  anesthesia, the right knee was prepped and draped to sterile surgical  field after identification of the signed limb in the time-out procedure.      Under tourniquet control, inferior lateral portal was utilized for the  operative approach and superior portal established for outflow and  anteromedial portal for a working surgical portal.  With the scope in  the inferolateral portal, moderate amount of

## 2023-05-02 ENCOUNTER — HOSPITAL ENCOUNTER (OUTPATIENT)
Dept: PHYSICAL THERAPY | Age: 43
Setting detail: THERAPIES SERIES
Discharge: HOME | End: 2023-05-02
Payer: COMMERCIAL

## 2023-05-02 PROCEDURE — 97110 THERAPEUTIC EXERCISES: CPT | Performed by: PHYSICAL THERAPIST

## 2023-05-02 PROCEDURE — 97530 THERAPEUTIC ACTIVITIES: CPT | Performed by: PHYSICAL THERAPIST

## 2023-05-02 NOTE — FLOWSHEET NOTE
The [facility] and [address],  Sports Performance and Rehabilitation, [facility] [address]  793 [facility],5Th Floor   [place]  Phone: [phone]  Fax: [phone]      Physical Therapy Daily Treatment Note  Date:  2023    Patient Name:  Marichuy Hardy    :  1980  MRN: [id number]  Restrictions/Precautions:    Medical/Treatment Diagnosis Information:  Primary osteoarthritis of right knee [M17.11]  Acute medial meniscus tear of right knee, initial encounter [S83.241A]  Right knee pain, unspecified chronicity [M25.561]  Treatment Diagnosis: M25.561 R Knee Pain, R53.1 Weakness  S/P R Knee Scope - PMME   Insurance/Certification information:  PT Insurance Information: [facility] - 61 VPCY, no auth  Physician Information:  Calvin Umana MD    Has the plan of care been signed (Y/N):        [x]  Yes  []  No     Date of Patient follow up with Physician: 3, 6 and 12  Patient seen in consultation with Dr. Logan Bradley who established the initial/subsequent treatment protocol. 23: surgery went well, should feel better, big flap of meniscus      Is this a Progress Report:     []  Yes  [x]  No        If Yes:  Date Range for reporting period:  Beginnin23  Ending:     Progress report will be due (10 Rx or 30 days whichever is less):        Recertification will be due (POC Duration  / 90 days whichever is less): 23         Visit # Insurance Allowable Auth Required   3 60 []  Yes []  No          OUTCOME MEASURE DATE SCORE   LEFS 23 20% deficit   LEFS 23 post-op 99% deficit       Latex Allergy:  [x]NO      []YES  Preferred Language for Healthcare:   [x]English       []other:    Pain level:  0-4/10     SUBJECTIVE: Pt progressively improving. Pretty stiff and sore in the morning but loosens up quickly. Using single crutch for longer distance walking. No issues with HEP. Superolateral incision is the most tender spot currently.      OBJECTIVE:

## 2023-05-04 ENCOUNTER — HOSPITAL ENCOUNTER (OUTPATIENT)
Dept: PHYSICAL THERAPY | Age: 43
Setting detail: THERAPIES SERIES
Discharge: HOME | End: 2023-05-04
Payer: COMMERCIAL

## 2023-05-04 PROCEDURE — 97530 THERAPEUTIC ACTIVITIES: CPT | Performed by: PHYSICAL THERAPIST

## 2023-05-04 PROCEDURE — 97110 THERAPEUTIC EXERCISES: CPT | Performed by: PHYSICAL THERAPIST

## 2023-05-04 NOTE — FLOWSHEET NOTE
The [facility] and [address],  Sports Performance and Rehabilitation, [facility] [address]  [address] [place] [address],[address]   [place], [address]  Phone: [phone]  Fax: [phone]      Physical Therapy Daily Treatment Note  Date:  2023    Patient Name:  Leonel Estrella    :  1980  MRN: [id number]  Restrictions/Precautions:    Medical/Treatment Diagnosis Information:  Primary osteoarthritis of right knee [M17.11]  Acute medial meniscus tear of right knee, initial encounter [S83.241A]  Right knee pain, unspecified chronicity [M25.561]  Treatment Diagnosis: M25.561 R Knee Pain, R53.1 Weakness  S/P R Knee Scope - PMME 02  Insurance/Certification information:  PT Insurance Information: [facility] - 61 VPCY, no auth  Physician Information:  Jeremy Soriano MD    Has the plan of care been signed (Y/N):        [x]  Yes  []  No     Date of Patient follow up with Physician: 3, 6 and 12  Patient seen in consultation with Dr. Shayna Murphy who established the initial/subsequent treatment protocol. 23: surgery went well, should feel better, big flap of meniscus      Is this a Progress Report:     []  Yes  [x]  No        If Yes:  Date Range for reporting period:  Beginnin23  Ending:     Progress report will be due (10 Rx or 30 days whichever is less): 64       Recertification will be due (POC Duration  / 90 days whichever is less): 23         Visit # Insurance Allowable Auth Required   4 60 []  Yes []  No          OUTCOME MEASURE DATE SCORE   LEFS 23 20% deficit   LEFS 23 post-op 99% deficit       Latex Allergy:  [x]NO      []YES  Preferred Language for Healthcare:   [x]English       []other:    Pain level:  0-4/10     SUBJECTIVE: Pt a little sore yesterday and today - generalized. No one specific thing caused it.  Extra walking, taking off TEDs, HEP, etc.     OBJECTIVE:     23          ROM PROM AROM Overpressure Comment     L R L R L R     Flexion 140 99

## 2023-05-09 ENCOUNTER — HOSPITAL ENCOUNTER (OUTPATIENT)
Dept: PHYSICAL THERAPY | Age: 43
Setting detail: THERAPIES SERIES
Discharge: HOME | End: 2023-05-09
Payer: COMMERCIAL

## 2023-05-09 PROCEDURE — 97110 THERAPEUTIC EXERCISES: CPT | Performed by: PHYSICAL THERAPIST

## 2023-05-09 PROCEDURE — 97530 THERAPEUTIC ACTIVITIES: CPT | Performed by: PHYSICAL THERAPIST

## 2023-05-09 NOTE — FLOWSHEET NOTE
Yes  [] No    Treatment/Activity Tolerance:  [x] Patient able to complete treatment  [] Patient limited by fatigue  [] Patient limited by pain     [] Patient limited by other medical complications  [x] Other: Pt doing well with steady progress made. Stiff compared to standard progression of ROM, but making progress. Pt noted challenge throughout. Had ant knee soreness with LAQ that did not worsen with reps, otherwise exercises were comfortable. Continues to have slow gait speed with mild antalgia, but improving each week. PLAN: See eval  [] Continue per plan of care [] Alter current plan (see comments above)  [x] Plan of care initiated [] Hold pending MD visit [] Discharge      Electronically signed by:  Bj Sanders, PT, DPT    Note: If patient does not return for scheduled/ recommended follow up visits, this note will serve as a discharge from care along with most recent update on progress.

## 2023-05-11 ENCOUNTER — HOSPITAL ENCOUNTER (OUTPATIENT)
Dept: PHYSICAL THERAPY | Age: 43
Setting detail: THERAPIES SERIES
Discharge: HOME | End: 2023-05-11
Payer: COMMERCIAL

## 2023-05-11 PROCEDURE — 97530 THERAPEUTIC ACTIVITIES: CPT | Performed by: PHYSICAL THERAPIST

## 2023-05-11 PROCEDURE — 97110 THERAPEUTIC EXERCISES: CPT | Performed by: PHYSICAL THERAPIST

## 2023-05-11 NOTE — FLOWSHEET NOTE
patient tolerance, in order to prevent re-injury. [] Progressing: [] Met: [] Not Met: [] Adjusted   2. Patient will have a decrease in pain to facilitate improvement in movement, function, and ADLs as indicated by Functional Deficits. [] Progressing: [] Met: [] Not Met: [] Adjusted     Long Term Goals: To be achieved in: 12 weeks  1. Disability index score of <30% deficit on LEFS to assist with reaching prior level of function. [] Progressing: [] Met: [] Not Met: [] Adjusted  2. Patient will demonstrate increased AROM to 0-135 deg to allow for proper joint functioning as indicated by patients Functional Deficits. [] Progressing: [] Met: [] Not Met: [] Adjusted  3. Patient will demonstrate an increase in Strength to good proximal hip strength and control, within 5lb HHD in LE to allow for proper functional mobility as indicated by patients Functional Deficits. [] Progressing: [] Met: [] Not Met: [] Adjusted  4. Patient will return to household functional activities without increased symptoms or restriction. [] Progressing: [] Met: [] Not Met: [] Adjusted  5. Pt will be able to ascend or descend a flight of stairs reciprocally without restriction or pain. (patient specific functional goal)    [] Progressing: [] Met: [] Not Met: [] Adjusted          Overall Progression Towards Functional goals/ Treatment Progress Update:  [] Patient is progressing as expected towards functional goals listed. [] Progression is slowed due to complexities/Impairments listed. [] Progression has been slowed due to co-morbidities.   [x] Plan just implemented, too soon to assess goals progression <30days   [] Goals require adjustment due to lack of progress  [] Patient is not progressing as expected and requires additional follow up with physician  [] Other    Prognosis for POC: [x] Good [] Fair  [] Poor      Patient requires continued skilled intervention: [x] Yes  [] No    Treatment/Activity Tolerance:  [x] Patient able

## 2023-05-16 ENCOUNTER — HOSPITAL ENCOUNTER (OUTPATIENT)
Dept: PHYSICAL THERAPY | Age: 43
Setting detail: THERAPIES SERIES
Discharge: HOME | End: 2023-05-16
Payer: COMMERCIAL

## 2023-05-16 PROCEDURE — 97110 THERAPEUTIC EXERCISES: CPT | Performed by: PHYSICAL THERAPIST

## 2023-05-16 PROCEDURE — 97530 THERAPEUTIC ACTIVITIES: CPT | Performed by: PHYSICAL THERAPIST

## 2023-05-16 NOTE — FLOWSHEET NOTE
CC TKE Balance 2' RC L8   Tandem stance 4 x 30\" Airex        PRE     Extension Flexion      Weight machines     JESSICA abd 2 x 10 ea side H4, L2; 25#   Leg press  3 x 10 B5, FP3; 95# DL   Leg extension     Leg curl 3 x 10 B5, L8; 55# DL        Manual interventions          PO bandage removal    Patient Education   HEP instruction:   Access Code: [email or web address]  URL: [email or web address]  Date: 04/25/2023  Prepared by: Astrid Mobley     Exercises  - Supine Ankle Pumps  - 12 x daily - 7 x weekly - 3 sets - 10 reps  - Supine Quadricep Sets  - 12 x daily - 7 x weekly - 10 reps - 10 sec hold  - Seated Active Assistive Knee Flexion and Extension  - 2 x daily - 7 x weekly - 1 sets - 10 reps - 10 sec hold  - Long Sitting Calf Stretch with Strap (Mirrored)  - 2 x daily - 7 x weekly - 1 sets - 5 reps - 30 hold  - Seated Table Hamstring Stretch (Mirrored)  - 2 x daily - 7 x weekly - 1 sets - 5 reps - 30 hold  - Long Sitting Ball Squeeze  - 2 x daily - 7 x weekly - 10 reps - 10 sec hold  - Supine Straight Leg Raises  - 2 x daily - 7 x weekly - 3 sets - 10 reps  - Sidelying Hip Abduction  - 2 x daily - 7 x weekly - 3 sets - 10 reps  - Sitting Heel Slide with Towel  - 2 x daily - 7 x weekly - 1 sets - 10 reps - 10 sec hold       Therapeutic Exercise and NMR EXR  [x] (38554) Provided verbal/tactile cueing for activities related to strengthening, flexibility, endurance, ROM for improvements in LE, proximal hip, and core control with self care, mobility, lifting, ambulation.  [] (58039) Provided verbal/tactile cueing for activities related to improving balance, coordination, kinesthetic sense, posture, motor skill, proprioception  to assist with LE, proximal hip, and core control in self care, mobility, lifting, ambulation and eccentric single leg control.      NMR and Therapeutic Activities:    [x] (27936 or 59056) Provided verbal/tactile cueing for activities related to improving balance, coordination, kinesthetic sense, posture,

## 2023-05-18 ENCOUNTER — HOSPITAL ENCOUNTER (OUTPATIENT)
Dept: PHYSICAL THERAPY | Age: 43
Setting detail: THERAPIES SERIES
Discharge: HOME | End: 2023-05-18
Payer: COMMERCIAL

## 2023-05-18 PROCEDURE — 97530 THERAPEUTIC ACTIVITIES: CPT | Performed by: PHYSICAL THERAPIST

## 2023-05-18 PROCEDURE — 97110 THERAPEUTIC EXERCISES: CPT | Performed by: PHYSICAL THERAPIST

## 2023-05-18 NOTE — FLOWSHEET NOTE
Met: [] Adjusted     Therapist goals for Patient:   Short Term Goals: To be achieved in: 2 weeks  1. Independent in HEP and progression per patient tolerance, in order to prevent re-injury. [] Progressing: [] Met: [] Not Met: [] Adjusted   2. Patient will have a decrease in pain to facilitate improvement in movement, function, and ADLs as indicated by Functional Deficits. [] Progressing: [] Met: [] Not Met: [] Adjusted     Long Term Goals: To be achieved in: 12 weeks  1. Disability index score of <30% deficit on LEFS to assist with reaching prior level of function. [] Progressing: [] Met: [] Not Met: [] Adjusted  2. Patient will demonstrate increased AROM to 0-135 deg to allow for proper joint functioning as indicated by patients Functional Deficits. [] Progressing: [] Met: [] Not Met: [] Adjusted  3. Patient will demonstrate an increase in Strength to good proximal hip strength and control, within 5lb HHD in LE to allow for proper functional mobility as indicated by patients Functional Deficits. [] Progressing: [] Met: [] Not Met: [] Adjusted  4. Patient will return to household functional activities without increased symptoms or restriction. [] Progressing: [] Met: [] Not Met: [] Adjusted  5. Pt will be able to ascend or descend a flight of stairs reciprocally without restriction or pain. (patient specific functional goal)    [] Progressing: [] Met: [] Not Met: [] Adjusted          Overall Progression Towards Functional goals/ Treatment Progress Update:  [] Patient is progressing as expected towards functional goals listed. [] Progression is slowed due to complexities/Impairments listed. [] Progression has been slowed due to co-morbidities.   [x] Plan just implemented, too soon to assess goals progression <30days   [] Goals require adjustment due to lack of progress  [] Patient is not progressing as expected and requires additional follow up with physician  [] Other    Prognosis for POC: [x]

## 2023-05-23 ENCOUNTER — OFFICE VISIT (OUTPATIENT)
Dept: ORTHOPEDIC SURGERY | Age: 43
End: 2023-05-23

## 2023-05-23 ENCOUNTER — HOSPITAL ENCOUNTER (OUTPATIENT)
Dept: PHYSICAL THERAPY | Age: 43
Setting detail: THERAPIES SERIES
Discharge: HOME | End: 2023-05-23
Payer: COMMERCIAL

## 2023-05-23 VITALS — BODY MASS INDEX: 33.59 KG/M2 | HEIGHT: 67 IN | WEIGHT: 214 LBS

## 2023-05-23 DIAGNOSIS — Z98.890 STATUS POST RIGHT KNEE SURGERY: ICD-10-CM

## 2023-05-23 DIAGNOSIS — Z09 POSTOP CHECK: Primary | ICD-10-CM

## 2023-05-23 DIAGNOSIS — M17.11 PRIMARY OSTEOARTHRITIS OF RIGHT KNEE: ICD-10-CM

## 2023-05-23 DIAGNOSIS — M25.561 RIGHT KNEE PAIN, UNSPECIFIED CHRONICITY: ICD-10-CM

## 2023-05-23 PROCEDURE — 97110 THERAPEUTIC EXERCISES: CPT | Performed by: PHYSICAL THERAPIST

## 2023-05-23 PROCEDURE — 99024 POSTOP FOLLOW-UP VISIT: CPT | Performed by: PHYSICIAN ASSISTANT

## 2023-05-23 PROCEDURE — 97530 THERAPEUTIC ACTIVITIES: CPT | Performed by: PHYSICAL THERAPIST

## 2023-05-23 NOTE — FLOWSHEET NOTE
with PT and MD appts by 10:00 at the latest.     OBJECTIVE:     5/16/23          ROM PROM AROM Overpressure Comment     L R L R L R     Flexion 140 119             Extension 0 -1                                                    Pre-op  Strength L R Comment   Quad 5 5     Hamstring 5 4     Gastroc NT NT     Hip  flexion 5 4+     Hip abd 4+ 4                             Girth L R Post-Vaso   Mid Patella 40.8 42.4  4.21   Suprapatellar 42.7 45.0 44.7   5cm above 47.0 47.9 N/A   15cm above     N/A   Vasopneumatic compression applied to knee for significant edema, swelling, pain control. ICD-10 code: R60.9 Edema unspecified.      TEST INITIAL 4/25/23 FOLLOW  UP GOAL   SINGLE LEG STANCE TIME L: 25\"+     R: 25\"+   >25 SECONDS   STAIR CLIMBING TEST 11.4\"   < 13 SEC (M&F)         Reflexes/Sensation:               [x]Dermatomes/Myotomes intact               []Reflexes equal and normal bilaterally              []Other:     Joint mobility:                [x]Normal                      []Hypo              []Hyper     Palpation: generalized around knee     Functional Mobility/Transfers: mod ind     Posture: Flexed knee     Bandages/Dressings/Incisions: healing well 5/10/23     Gait: (include devices/WB status) Slow gait speed, mildly antalgic 5/9/23     RESTRICTIONS/PRECAUTIONS: PF & MFC 2B cartilage damage    Exercises/Interventions: ROM first  Exercise/Equipment Resistance/Repetitions Other comments   Stretching     Hamstring 3 x 30\"    Towel Pull     Inclined Calf    Hip Flexion     ITB     Groin     Quad 3 x 30\" Prone w/ strap; L foot on floor        Rec Bike 7' Res 5; Seat 4 (handle)        SLR     Supine 3 x 10 2#   Abduction Adduction 3 x 10 2#   Prone     SLR+          Isometrics     Quad sets    Ball squeeze         Glutes     H/L clams Bridge + ball squ Side stepping Monster walks DL 3 x 10 15# KB        ROM     Sheet Pulls    Hang Weights     Passive     Active     Weight Shift     Ankle Pumps     ERMI 5 x 30\"

## 2023-05-23 NOTE — PROGRESS NOTES
Date:  2023    Name:  Linda Rios  Address:  [address] [facility] [address]    :  1980      Age:   43 y.o. Chief Complaint    Knee Pain (R knee f/u 23)      History of Present Illness:  Linda Rios is a 43 y.o. male who presents for a postop check. This patient is approximately 5 weeks status post a right knee arthroscopy and partial medial meniscectomy conducted by Dr. Marquis Tejeda on 2023. The patient feels that he has improved with regards to his sharp medial knee pain. He has conducted conservative treatment and followed our postoperative protocol which has included: rest, ice, elevation, bracing, physical therapy, home exercises, activity modification, and NSAIDs as needed. He still feels a 2/10 achiness within the medial joint line. In addition, he notes a new clicking sensation in the patellofemoral joint when he attempts to achieve terminal extension. He denies any other mechanical symptoms or sensations of instability. There is still some swelling in the knee joint with prolonged activity. The patient denies any new injury. The patient denies any catching, giving way, joint locking, numbness, or paresthesias. Past Medical History:   Diagnosis Date    Medial meniscus tear     RIGHT    Seasonal allergies     Wears contact lenses     Wears glasses         Past Surgical History:   Procedure Laterality Date    APPENDECTOMY      CHOLECYSTECTOMY      KNEE ARTHROSCOPY Right 2023    RIGHT KNEE PARTIAL MEDIAL MENISCECTOMY, ARTHROSCOPIC performed by Marquis Ileana MD at [place] [address]         No family history on file.     Social History     Socioeconomic History    Marital status:      Spouse name: None    Number of children: None    Years of education: None    Highest education level: None   Tobacco Use    Smoking status: Some Days     Types: Cigarettes   Vaping Use    Vaping Use: Never used   Substance and Sexual

## 2023-05-31 ENCOUNTER — HOSPITAL ENCOUNTER (OUTPATIENT)
Dept: PHYSICAL THERAPY | Age: 43
Setting detail: THERAPIES SERIES
Discharge: HOME | End: 2023-05-31
Payer: COMMERCIAL

## 2023-05-31 PROCEDURE — 97530 THERAPEUTIC ACTIVITIES: CPT | Performed by: PHYSICAL THERAPIST

## 2023-05-31 PROCEDURE — 97110 THERAPEUTIC EXERCISES: CPT | Performed by: PHYSICAL THERAPIST

## 2023-05-31 NOTE — FLOWSHEET NOTE
The [facility] and [address],  Sports Performance and Rehabilitation, [facility] [address]  793 [facility],5Th Floor   [place]  Phone: [phone]  Fax: [phone]      Physical Therapy Daily Treatment Note  Date:  2023    Patient Name:  Marcelle Saucedo    :  1980  MRN: [id number]  Restrictions/Precautions:    Medical/Treatment Diagnosis Information:  Primary osteoarthritis of right knee [M17.11]  Acute medial meniscus tear of right knee, initial encounter [S83.241A]  Right knee pain, unspecified chronicity [M25.561]  Treatment Diagnosis: M25.561 R Knee Pain, R53.1 Weakness  S/P R Knee Scope - PMME   Insurance/Certification information:  PT Insurance Information: [facility] - 61 VPCY, no auth  Physician Information:  Elinor Child MD    Has the plan of care been signed (Y/N):        [x]  Yes  []  No     Date of Patient follow up with Physician: 8 and 12 weeks PO  Patient seen in consultation with Dr. Hussein Del Toro who established the initial/subsequent treatment protocol. 23: surgery went well, should feel better, big flap of meniscus  23: seen in MD office      Is this a Progress Report:     []  Yes  [x]  No        If Yes:  Date Range for reporting period:  Beginnin23  Ending:     Progress report will be due (10 Rx or 30 days whichever is less): 64       Recertification will be due (POC Duration  / 90 days whichever is less): 23       PN NV  Visit # Insurance Allowable Auth Required   9 60 []  Yes []  No          OUTCOME MEASURE DATE SCORE   LEFS 23 20% deficit   LEFS 23 post-op 99% deficit       Latex Allergy:  [x]NO      []YES  Preferred Language for Healthcare:   [x]English       []other:    Pain level:  0-4/10     SUBJECTIVE: 5 weeks PO. Pt does feel some improvement in stiffness and soreness in the last week.      OBJECTIVE:     23          ROM PROM AROM Overpressure Comment     L R L R L R     Flexion 140 123

## 2023-06-09 ENCOUNTER — HOSPITAL ENCOUNTER (OUTPATIENT)
Dept: PHYSICAL THERAPY | Age: 43
Setting detail: THERAPIES SERIES
Discharge: HOME | End: 2023-06-09
Payer: COMMERCIAL

## 2023-06-09 PROCEDURE — 97110 THERAPEUTIC EXERCISES: CPT | Performed by: PHYSICAL THERAPIST

## 2023-06-09 PROCEDURE — 97530 THERAPEUTIC ACTIVITIES: CPT | Performed by: PHYSICAL THERAPIST

## 2023-06-09 NOTE — FLOWSHEET NOTE
expected and requires additional follow up with physician  [] Other    Prognosis for POC: [x] Good [] Fair  [] Poor      Patient requires continued skilled intervention: [x] Yes  [] No    Treatment/Activity Tolerance:  [x] Patient able to complete treatment  [] Patient limited by fatigue  [] Patient limited by pain     [] Patient limited by other medical complications  [x] Other: Patient continues to be progressing steadily. Pt continues to be completing the joint protective program. Pt still needs minor VCs to improve knee stability and proprioceptive awareness of knee control during CKC exercises. Pt continues to have reduced tenderness at the superolateral incision and inferomedial incision which improves with STM to this area. Medial glide of patella continues to show relative hypomobility. Program will continue to be adjusted based on pt's tolerance. Pt is advised to continue with skilled PT intervention to improve strength, dynamic motor control, and balance deficits. PLAN: See eval  [] Continue per plan of care [] Alter current plan (see comments above)  [x] Plan of care initiated [] Hold pending MD visit [] Discharge      Electronically signed by:  Madhav De Jesus PT, DPT    Note: If patient does not return for scheduled/ recommended follow up visits, this note will serve as a discharge from care along with most recent update on progress.

## 2023-07-10 ENCOUNTER — HOSPITAL ENCOUNTER (OUTPATIENT)
Dept: PHYSICAL THERAPY | Age: 43
Setting detail: THERAPIES SERIES
Discharge: HOME | End: 2023-07-10
Payer: COMMERCIAL

## 2023-07-10 PROCEDURE — 97110 THERAPEUTIC EXERCISES: CPT | Performed by: PHYSICAL THERAPIST

## 2023-07-10 PROCEDURE — 97750 PHYSICAL PERFORMANCE TEST: CPT | Performed by: PHYSICAL THERAPIST

## 2023-07-10 PROCEDURE — 97530 THERAPEUTIC ACTIVITIES: CPT | Performed by: PHYSICAL THERAPIST

## (undated) DEVICE — MENISCECTOMY ELECTRODE BASIC KIT; STANDARD DESIGN, SHORT: Brand: CONMED

## (undated) DEVICE — PENCIL ELECSURG HND CTRL ALL IN 1 MONOPOLAR LAP

## (undated) DEVICE — TOWEL,STOP FLAG GOLD N-W: Brand: MEDLINE

## (undated) DEVICE — SUTURE VCRL UD BR CT 3-0 18IN CT1 J838D

## (undated) DEVICE — BLADE SHV L13CM DIA4MM TAPR TIP SCIS LIKE CUT OVL OUTER

## (undated) DEVICE — SUTURE VCRL SZ 0 L18IN ABSRB UD L36MM CT-1 1/2 CIR J840D

## (undated) DEVICE — SOLUTION IV 1000ML 0.9% SOD CHL

## (undated) DEVICE — TUBING FLD MGMT Y DBL SPIK DUALWAVE

## (undated) DEVICE — 87K ARTHROSCOPY TUBING SET: Brand: 87K

## (undated) DEVICE — ELECTRODE PT RET AD L9FT HI MOIST COND ADH HYDRGEL CORDED

## (undated) DEVICE — SOLUTION IRRIG 3000ML LAC R FLX CONT

## (undated) DEVICE — GOWN,SIRUS,POLYRNF,BRTHSLV,XLN/XXL,18/CS: Brand: MEDLINE

## (undated) DEVICE — UNDERGLOVE SURG SZ 9 FNGR THK0.21MIL GRN LTX BEAD CUF

## (undated) DEVICE — SHEET,DRAPE,53X77,STERILE: Brand: MEDLINE

## (undated) DEVICE — STRIP,CLOSURE,WOUND,MEDI-STRIP,1/2X4: Brand: MEDLINE

## (undated) DEVICE — GOWN,SIRUS,POLYRNF,BRTHSLV,XL,30/CS: Brand: MEDLINE

## (undated) DEVICE — COUNTER NDL 40 COUNT HLD 70 NUM FOAM BLK SGL MAG W BLDE REMV

## (undated) DEVICE — GLOVE SURG SZ 7 L12IN FNGR THK79MIL GRN LTX FREE

## (undated) DEVICE — 3M™ IOBAN™ 2 ANTIMICROBIAL INCISE DRAPE 6648EZ: Brand: IOBAN™ 2

## (undated) DEVICE — GLOVE SURG SZ 7 CRM LTX FREE POLYISOPRENE POLYMER BEAD ANTI

## (undated) DEVICE — FLUID TRAP FOR MINIVAC ES EQUIP FLD TRAP

## (undated) DEVICE — TUBING PMP L6FT CONT WAVE EXTN

## (undated) DEVICE — BLADE,CARBON-STEEL,10,STRL,DISPOSABLE,TB: Brand: MEDLINE

## (undated) DEVICE — KNEE ARTHROSCOPY: Brand: MEDLINE INDUSTRIES, INC.